# Patient Record
Sex: MALE | Race: WHITE | NOT HISPANIC OR LATINO | URBAN - METROPOLITAN AREA
[De-identification: names, ages, dates, MRNs, and addresses within clinical notes are randomized per-mention and may not be internally consistent; named-entity substitution may affect disease eponyms.]

---

## 2018-11-08 ENCOUNTER — OFFICE VISIT (OUTPATIENT)
Dept: URBAN - METROPOLITAN AREA CLINIC 76 | Facility: CLINIC | Age: 53
End: 2018-11-08
Payer: COMMERCIAL

## 2018-11-08 DIAGNOSIS — H10.12 ACUTE ATOPIC CONJUNCTIVITIS, LEFT EYE: Primary | ICD-10-CM

## 2018-11-08 PROCEDURE — 99214 OFFICE O/P EST MOD 30 MIN: CPT | Performed by: OPTOMETRIST

## 2018-11-08 RX ORDER — TOBRAMYCIN AND DEXAMETHASONE 3; 1 MG/ML; MG/ML
SUSPENSION/ DROPS OPHTHALMIC
Qty: 1 | Refills: 0 | Status: INACTIVE
Start: 2018-11-08 | End: 2018-11-14

## 2018-11-08 ASSESSMENT — INTRAOCULAR PRESSURE
OS: 17
OD: 18

## 2018-11-08 NOTE — IMPRESSION/PLAN
Impression: Acute atopic conjunctivitis, left eye: H10.12. Inflammation OU Plan: Pt has had success with tdex in past.  Rx qid x 5-7days.   Call/rtc if not improving

## 2019-09-17 ENCOUNTER — OFFICE VISIT (OUTPATIENT)
Dept: URBAN - METROPOLITAN AREA CLINIC 81 | Facility: CLINIC | Age: 54
End: 2019-09-17
Payer: COMMERCIAL

## 2019-09-17 DIAGNOSIS — H11.011 AMYLOID PTERYGIUM OF RIGHT EYE: ICD-10-CM

## 2019-09-17 DIAGNOSIS — H04.129 DRY EYE SYNDROME OF UNSPECIFIED LACRIMAL GLAND: ICD-10-CM

## 2019-09-17 DIAGNOSIS — H52.4 PRESBYOPIA: Primary | ICD-10-CM

## 2019-09-17 PROCEDURE — 92014 COMPRE OPH EXAM EST PT 1/>: CPT | Performed by: OPTOMETRIST

## 2019-09-17 ASSESSMENT — VISUAL ACUITY
OS: 20/20
OD: 20/20

## 2019-09-17 ASSESSMENT — KERATOMETRY
OD: 42.75
OS: 42.88

## 2019-09-17 ASSESSMENT — INTRAOCULAR PRESSURE
OD: 16
OS: 14

## 2019-09-17 NOTE — IMPRESSION/PLAN
Impression: Dry eye syndrome of unspecified lacrimal gland: H04.129.  Plan: Reviewed AT with lid closure, gel or avani hs

## 2019-09-17 NOTE — IMPRESSION/PLAN
Impression: Amyloid pterygium of right eye: H11.011. Plan: Reviewed AT with lid closure, gel or avani hs.   UV protection

## 2021-03-05 ENCOUNTER — OFFICE VISIT (OUTPATIENT)
Dept: URBAN - METROPOLITAN AREA CLINIC 76 | Facility: CLINIC | Age: 56
End: 2021-03-05
Payer: COMMERCIAL

## 2021-03-05 DIAGNOSIS — H25.13 AGE-RELATED NUCLEAR CATARACT, BILATERAL: ICD-10-CM

## 2021-03-05 DIAGNOSIS — H01.8 OTHER SPECIFIED INFLAMMATIONS OF EYELID: ICD-10-CM

## 2021-03-05 PROCEDURE — 92014 COMPRE OPH EXAM EST PT 1/>: CPT | Performed by: OPTOMETRIST

## 2021-03-05 ASSESSMENT — INTRAOCULAR PRESSURE
OS: 14
OD: 16

## 2021-03-05 ASSESSMENT — VISUAL ACUITY
OS: 20/20
OD: 20/20

## 2021-03-05 ASSESSMENT — KERATOMETRY
OD: 42.88
OS: 43.00

## 2021-03-05 NOTE — IMPRESSION/PLAN
Impression: Other specified inflammations of eyelid: H01.8. Blepharitis Plan: Blepharitis accounts for the patient's symptoms. Lid scrubs with diluted Pedersen Dye and Freddie tear free baby shampoo as directed and monitor PRN.

## 2021-03-05 NOTE — IMPRESSION/PLAN
Impression: Amyloid pterygium of right eye: H11.011. Plan: Small pterygium accounts for the patients symptoms. Discussed diagnosis with patient. Systane Balance/Complete or Refresh Repair 1 drop OU QID. Emphasize importance of UV protection and lubrication.

## 2021-03-05 NOTE — IMPRESSION/PLAN
Impression: Age-related nuclear cataract, bilateral: H25.13. Plan: Observe, trace OU. No treatment currently recommended as cataracts do not affect the patients day to day life. Patient to monitor for vision changes and if vision significantly worsens, advised to RTC for evaluation.

## 2023-08-09 ENCOUNTER — OFFICE VISIT (OUTPATIENT)
Dept: URBAN - METROPOLITAN AREA CLINIC 76 | Facility: CLINIC | Age: 58
End: 2023-08-09
Payer: COMMERCIAL

## 2023-08-09 DIAGNOSIS — H52.4 PRESBYOPIA: Primary | ICD-10-CM

## 2023-08-09 PROCEDURE — 92014 COMPRE OPH EXAM EST PT 1/>: CPT | Performed by: OPTOMETRIST

## 2023-08-09 ASSESSMENT — KERATOMETRY
OD: 42.75
OS: 42.63

## 2023-08-09 ASSESSMENT — INTRAOCULAR PRESSURE
OD: 17
OS: 15

## 2023-08-09 ASSESSMENT — VISUAL ACUITY
OD: 20/25
OS: 20/20

## 2024-08-16 ENCOUNTER — OFFICE VISIT (OUTPATIENT)
Dept: URBAN - METROPOLITAN AREA CLINIC 76 | Facility: CLINIC | Age: 59
End: 2024-08-16
Payer: COMMERCIAL

## 2024-08-16 DIAGNOSIS — H11.011 AMYLOID PTERYGIUM OF RIGHT EYE: ICD-10-CM

## 2024-08-16 DIAGNOSIS — H52.4 PRESBYOPIA: ICD-10-CM

## 2024-08-16 DIAGNOSIS — H25.13 AGE-RELATED NUCLEAR CATARACT, BILATERAL: Primary | ICD-10-CM

## 2024-08-16 PROCEDURE — 99213 OFFICE O/P EST LOW 20 MIN: CPT | Performed by: OPTOMETRIST

## 2024-08-16 ASSESSMENT — INTRAOCULAR PRESSURE
OS: 15
OD: 17

## 2024-08-16 ASSESSMENT — KERATOMETRY
OD: 42.88
OS: 42.88